# Patient Record
Sex: FEMALE | Race: BLACK OR AFRICAN AMERICAN
[De-identification: names, ages, dates, MRNs, and addresses within clinical notes are randomized per-mention and may not be internally consistent; named-entity substitution may affect disease eponyms.]

---

## 2018-03-21 ENCOUNTER — HOSPITAL ENCOUNTER (OUTPATIENT)
Dept: HOSPITAL 53 - M SDC | Age: 28
Discharge: HOME | End: 2018-03-21
Attending: PODIATRIST
Payer: COMMERCIAL

## 2018-03-21 DIAGNOSIS — Q66.89: Primary | ICD-10-CM

## 2018-03-21 DIAGNOSIS — D35.2: ICD-10-CM

## 2018-03-21 LAB
CONTROL LINE UCG: (no result)
URINE PREG TEST: NEGATIVE

## 2018-03-21 PROCEDURE — 28308 INCISION OF METATARSAL: CPT

## 2018-03-21 RX ADMIN — DEXAMETHASONE SODIUM PHOSPHATE 1 MG: 4 INJECTION, SOLUTION INTRAMUSCULAR; INTRAVENOUS at 08:57

## 2018-03-21 RX ADMIN — BUPIVACAINE HYDROCHLORIDE 1 ML: 5 INJECTION, SOLUTION EPIDURAL; INTRACAUDAL at 07:39

## 2018-03-21 RX ADMIN — DEXAMETHASONE SODIUM PHOSPHATE 1 MG: 4 INJECTION, SOLUTION INTRAMUSCULAR; INTRAVENOUS at 08:26

## 2018-03-21 RX ADMIN — LIDOCAINE HYDROCHLORIDE 1 ML: 10 INJECTION, SOLUTION INFILTRATION; PERINEURAL at 07:39

## 2018-03-21 RX ADMIN — SODIUM CHLORIDE, POTASSIUM CHLORIDE, SODIUM LACTATE AND CALCIUM CHLORIDE 1 MLS/HR: 600; 310; 30; 20 INJECTION, SOLUTION INTRAVENOUS at 07:05

## 2018-08-16 ENCOUNTER — HOSPITAL ENCOUNTER (OUTPATIENT)
Dept: HOSPITAL 53 - M LAB REF | Age: 28
End: 2018-08-16
Attending: PODIATRIST
Payer: COMMERCIAL

## 2018-08-16 DIAGNOSIS — L03.116: Primary | ICD-10-CM

## 2018-08-16 PROCEDURE — 87186 SC STD MICRODIL/AGAR DIL: CPT

## 2018-09-05 ENCOUNTER — HOSPITAL ENCOUNTER (OUTPATIENT)
Dept: HOSPITAL 53 - M SDC | Age: 28
Discharge: HOME | End: 2018-09-05
Attending: PODIATRIST
Payer: COMMERCIAL

## 2018-09-05 DIAGNOSIS — Z79.899: ICD-10-CM

## 2018-09-05 DIAGNOSIS — M21.6X2: ICD-10-CM

## 2018-09-05 DIAGNOSIS — D35.2: ICD-10-CM

## 2018-09-05 DIAGNOSIS — Z47.2: Primary | ICD-10-CM

## 2018-09-05 LAB
CONTROL LINE UCG: (no result)
URINE PREG TEST: NEGATIVE

## 2018-09-05 PROCEDURE — 28308 INCISION OF METATARSAL: CPT

## 2018-09-05 RX ADMIN — LIDOCAINE HYDROCHLORIDE 1 ML: 10 INJECTION, SOLUTION EPIDURAL; INFILTRATION; INTRACAUDAL; PERINEURAL at 09:35

## 2018-09-05 RX ADMIN — HYDROCODONE BITARTRATE AND ACETAMINOPHEN 1 TAB: 5; 325 TABLET ORAL at 11:45

## 2018-09-05 RX ADMIN — DEXAMETHASONE SODIUM PHOSPHATE 1 MG: 4 INJECTION, SOLUTION INTRAMUSCULAR; INTRAVENOUS at 09:09

## 2018-09-05 RX ADMIN — BUPIVACAINE HYDROCHLORIDE 1 ML: 5 INJECTION, SOLUTION EPIDURAL; INTRACAUDAL at 09:35

## 2019-03-13 ENCOUNTER — HOSPITAL ENCOUNTER (EMERGENCY)
Dept: HOSPITAL 53 - M ED | Age: 29
Discharge: HOME | End: 2019-03-13
Payer: COMMERCIAL

## 2019-03-13 VITALS — HEIGHT: 65 IN | WEIGHT: 183.42 LBS | BODY MASS INDEX: 30.56 KG/M2

## 2019-03-13 VITALS — DIASTOLIC BLOOD PRESSURE: 64 MMHG | SYSTOLIC BLOOD PRESSURE: 122 MMHG

## 2019-03-13 DIAGNOSIS — Z3A.00: ICD-10-CM

## 2019-03-13 DIAGNOSIS — O20.0: Primary | ICD-10-CM

## 2019-03-13 LAB
B-HCG SERPL-ACNC: 596 MIU/ML
BASOPHILS # BLD AUTO: 0 10^3/UL (ref 0–0.2)
BASOPHILS NFR BLD AUTO: 0.3 % (ref 0–1)
BUN SERPL-MCNC: 10 MG/DL (ref 7–18)
CALCIUM SERPL-MCNC: 9.5 MG/DL (ref 8.5–10.1)
CHLORIDE SERPL-SCNC: 108 MEQ/L (ref 98–107)
CO2 SERPL-SCNC: 25 MEQ/L (ref 21–32)
CREAT SERPL-MCNC: 0.89 MG/DL (ref 0.55–1.3)
EOSINOPHIL # BLD AUTO: 0 10^3/UL (ref 0–0.5)
EOSINOPHIL NFR BLD AUTO: 0.1 % (ref 0–3)
GFR SERPL CREATININE-BSD FRML MDRD: > 60 ML/MIN/{1.73_M2} (ref 60–?)
GLUCOSE SERPL-MCNC: 101 MG/DL (ref 70–100)
HCT VFR BLD AUTO: 37.5 % (ref 36–47)
HGB BLD-MCNC: 12.5 G/DL (ref 12–15.5)
LYMPHOCYTES # BLD AUTO: 1.3 10^3/UL (ref 1.5–4.5)
LYMPHOCYTES NFR BLD AUTO: 17.8 % (ref 24–44)
MCH RBC QN AUTO: 32.7 PG (ref 27–33)
MCHC RBC AUTO-ENTMCNC: 33.3 G/DL (ref 32–36.5)
MCV RBC AUTO: 98.2 FL (ref 80–96)
MONOCYTES # BLD AUTO: 0.4 10^3/UL (ref 0–0.8)
MONOCYTES NFR BLD AUTO: 5 % (ref 0–5)
NEUTROPHILS # BLD AUTO: 5.7 10^3/UL (ref 1.8–7.7)
NEUTROPHILS NFR BLD AUTO: 76.4 % (ref 36–66)
PLATELET # BLD AUTO: 250 10^3/UL (ref 150–450)
POTASSIUM SERPL-SCNC: 4.1 MEQ/L (ref 3.5–5.1)
RBC # BLD AUTO: 3.82 10^6/UL (ref 4–5.4)
SODIUM SERPL-SCNC: 140 MEQ/L (ref 136–145)
WBC # BLD AUTO: 7.4 10^3/UL (ref 4–10)

## 2019-03-14 NOTE — HPE
DATE OF ADMISSION:  2019

 

This lady is a 28-year-old  2, para 1 who was seen through the emergency

department who came in with excessive vaginal bleeding and some lower abdominal

pain.  She did a urine pregnancy test at the end of January, did one positive

urine pregnancy test in February, and then came into emergency with vaginal

bleeding and lower pelvic pain.  Her last menstrual period (LMP) was 2019.

 

Her past history is that she has had a previous  section for a live birth

female infant because of nonreassuring fetal heart tones, and she has presently

been using nothing for birth control attempting to get pregnant.  She is a

prolactinoma and she is on cabergoline 0.5 mg p.o. twice a week to reduce her

prolactin level so that she get pregnant.  Besides her  section, she has

had surgery on her left ankle and she has had a wisdom teeth removed and she has

regular evaluations of her pituitary gland.  She has been on her medication since

, which has been able to reduce her prolactin level.  She denies any

headache, visual disturbances, pain or pressure in her head.  No evidence of

migraines.

 

On examination, she does not appear to be in any acute distress.  Her temperature

is 98.5, pulse was 88, blood pressure was 142/84 on admission, respirations were

20 with 98% of oxygen saturation.

 

CBC shows that she has a low hemoglobin of 12.5, hematocrit 37.5 and platelets

were 250.  Her urine shows a 1.009, pH of 7, negative protein, negative glucose,

negative bilirubin, negative nitrates.  She has a trace of leukocyte esterase, 3+

blood, 1+ bacteria.  Her anion gap is 7 and her rest of her chemistry is within

normal limits.  GFR is greater than 60.  Her quantitative beta hCG is 596.

 

She had an initial ultrasound which showed a empty uterus, appeared to have a

normal endometrial thickness, a right adnexal area which was normal.  Could not

demonstrate the left adnexal area, but thought maybe she had a vascular lesion in

the adnexal area but not distinguishable from the ovary.  There was no free fluid

in the cul-de-sac.

 

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

 

The rest of her examination is unremarkable.  She is normocephalic, atraumatic.

Neck full range of motion.  Pupils equal and reactive to light.  Distal pulses

are symmetric.  No evidence of deep vein thrombosis (DVT), pulmonary embolism

(PE), or superficial phlebitis.  Lungs are clear bilaterally to bases.  No

wheezes or rhonchi.  Abdomen is soft, four quadrant bowel sounds are noted.  She

has no rebound or guarding.  She has no rashes, lesions or pruritus.  No

arthralgia or myalgia.  No complaints of cough, wheezes, shortness of breath or

dyspnea on exertion.  She has no nausea, vomiting or diarrhea.  The only

endocrine issue she has is her prolactinoma under control with medication.  She

has no GYN issues.

 

Past medical and surgical history, as mentioned before.

 

Family history is noncontributory.

 

She does not smoke, drink or abuse drugs.  No domestic violence.   to a

soldier with good support systems.

 

On examination speculum, the cervix is closed.  There is some fresh blood, but

not profuse amount.  The uterus is tender, midline and slightly enlarged.  The

left adnexal area is tender.  The right adnexa area is nontender.

 

We discussed with the patient plan of management in the fact that she does not

have an ectopic pregnancy, does have not dermoid tumor.  Explained to her what a

dermoid tumor was.  Explained the fact that she may be in the throws of

spontaneous miscarriage or complete miscarriage; however, this has to be dealt

with as a separate issue to her dermoid.  In fact, we will repeat her

quantitative beta hCG at [facility] in 48 hours time and she is to come and get a

letter for quarters and she has to make an appointment with Dr. Gould on Tuesday,

she has a repeat of her quant and possibility at that time in evaluating if it is

a complete  or if it is a missed , to have a combination of a

dilatation and curettage with a laparoscopic excision of her left dermoid tumor.

 

 

The patient was encouraged to come back to the hospital if she has an acute

episode of left lower quadrant pain, which may indicate torsion, or if she has

profuse bleeding which may indicate uncontrolled missed .  The patient

and the  expressed understanding.  The patient left with instructions and

a preliminary letter of quarters, but will  the official document tomorrow

morning and make her appointments.

## 2019-07-14 ENCOUNTER — HOSPITAL ENCOUNTER (EMERGENCY)
Dept: HOSPITAL 53 - M ED | Age: 29
Discharge: HOME | End: 2019-07-14
Payer: COMMERCIAL

## 2019-07-14 VITALS — HEIGHT: 65 IN | WEIGHT: 181.66 LBS | BODY MASS INDEX: 30.27 KG/M2

## 2019-07-14 VITALS — SYSTOLIC BLOOD PRESSURE: 151 MMHG | DIASTOLIC BLOOD PRESSURE: 95 MMHG

## 2019-07-14 DIAGNOSIS — O20.8: Primary | ICD-10-CM

## 2019-07-14 DIAGNOSIS — Z3A.10: ICD-10-CM

## 2019-07-14 LAB
BASOPHILS # BLD AUTO: 0 10^3/UL (ref 0–0.2)
BASOPHILS NFR BLD AUTO: 0.1 % (ref 0–1)
EOSINOPHIL # BLD AUTO: 0 10^3/UL (ref 0–0.5)
EOSINOPHIL NFR BLD AUTO: 0.5 % (ref 0–3)
HCT VFR BLD AUTO: 35.8 % (ref 36–47)
HGB BLD-MCNC: 12.4 G/DL (ref 12–15.5)
LYMPHOCYTES # BLD AUTO: 2.4 10^3/UL (ref 1.5–4.5)
LYMPHOCYTES NFR BLD AUTO: 29.1 % (ref 24–44)
MCH RBC QN AUTO: 33.7 PG (ref 27–33)
MCHC RBC AUTO-ENTMCNC: 34.6 G/DL (ref 32–36.5)
MCV RBC AUTO: 97.3 FL (ref 80–96)
MONOCYTES # BLD AUTO: 0.6 10^3/UL (ref 0–0.8)
MONOCYTES NFR BLD AUTO: 7 % (ref 0–5)
NEUTROPHILS # BLD AUTO: 5.2 10^3/UL (ref 1.8–7.7)
NEUTROPHILS NFR BLD AUTO: 62.9 % (ref 36–66)
PLATELET # BLD AUTO: 247 10^3/UL (ref 150–450)
RBC # BLD AUTO: 3.68 10^6/UL (ref 4–5.4)
WBC # BLD AUTO: 8.3 10^3/UL (ref 4–10)

## 2019-07-14 NOTE — REPVR
EXAM: 

 US Pregnancy First Trimester, Transabdominal 



EXAM DATE/TIME: 

 7/14/2019 8:16 PM 



CLINICAL HISTORY: 

 29 years old, female; Lmp or gestational age (in weeks): 9w4d; Antepartum 

complications; Bleeding; Pregnant; Additional info: Vaginal bleeding 



TECHNIQUE: 

 Imaging protocol: Real-time transabdominal obstetrical ultrasound of the 

maternal pelvis and a first trimester pregnancy, less than 14 weeks 0 days, 

with image documentation. 



COMPARISON: 

 No relevant prior studies available. 



FINDINGS: 



GESTATION: 

 Gestation: Gestational sac present. Fetal pole present. 

 Heart rate: The fetal heart rate is 160 beats per minute. 

 Placenta: A small subchorionic hemorrhage measuring 1.3 cm x 1.0 cm x 1.0 cm 

is present. 

 Amniotic fluid: Normal for gestational age. 



BIOMETRY: 

 Estimated gestational age: Estimated gestational age by crown-rump length of 

3.3 cm is 10 weeks, 1 day. 

 Estimated due date: The estimated date of delivery by ultrasound is 

02/08/2020. 



MATERNAL: 

 Uterus: Unremarkable. 

 Cervix: Unremarkable. 

 Right adnexa: There are 2 cysts within the right ovary. These measure 4.2 cm x 

2.7 cm x 3.0 cm and 4.3 cm x 2.9 cm x 3.1 cm. 

 Left adnexa: A known left ovarian dermoid is again identified. It measures 4.2 

cm x 3.5 cm x 3.7 cm. 

 Intraperitoneal: No free intraperitoneal fluid is identified. 



IMPRESSION: 

1. Single, live intrauterine pregnancy with an estimated gestational age of 10 

weeks, 1 day and a heart rate of 160 beats per minute. 

2. Small subchorionic hemorrhage. 



Electronically signed by: Griffin Casas On 07/14/2019  21:37:50 PM

## 2020-02-01 ENCOUNTER — HOSPITAL ENCOUNTER (INPATIENT)
Dept: HOSPITAL 53 - M LDO | Age: 30
LOS: 2 days | Discharge: HOME | DRG: 773 | End: 2020-02-03
Attending: OBSTETRICS & GYNECOLOGY | Admitting: OBSTETRICS & GYNECOLOGY
Payer: COMMERCIAL

## 2020-02-01 ENCOUNTER — HOSPITAL ENCOUNTER (OUTPATIENT)
Dept: HOSPITAL 53 - M LDO | Age: 30
Discharge: HOME | End: 2020-02-01
Attending: OBSTETRICS & GYNECOLOGY
Payer: COMMERCIAL

## 2020-02-01 VITALS — WEIGHT: 205.25 LBS | BODY MASS INDEX: 34.2 KG/M2 | HEIGHT: 65 IN

## 2020-02-01 VITALS — BODY MASS INDEX: 34.6 KG/M2 | HEIGHT: 65 IN | WEIGHT: 207.68 LBS

## 2020-02-01 VITALS — DIASTOLIC BLOOD PRESSURE: 75 MMHG | SYSTOLIC BLOOD PRESSURE: 135 MMHG

## 2020-02-01 VITALS — DIASTOLIC BLOOD PRESSURE: 66 MMHG | SYSTOLIC BLOOD PRESSURE: 123 MMHG

## 2020-02-01 VITALS — DIASTOLIC BLOOD PRESSURE: 71 MMHG | SYSTOLIC BLOOD PRESSURE: 120 MMHG

## 2020-02-01 VITALS — DIASTOLIC BLOOD PRESSURE: 64 MMHG | SYSTOLIC BLOOD PRESSURE: 123 MMHG

## 2020-02-01 VITALS — DIASTOLIC BLOOD PRESSURE: 73 MMHG | SYSTOLIC BLOOD PRESSURE: 127 MMHG

## 2020-02-01 VITALS — DIASTOLIC BLOOD PRESSURE: 71 MMHG | SYSTOLIC BLOOD PRESSURE: 124 MMHG

## 2020-02-01 VITALS — SYSTOLIC BLOOD PRESSURE: 119 MMHG | DIASTOLIC BLOOD PRESSURE: 71 MMHG

## 2020-02-01 DIAGNOSIS — Z3A.39: ICD-10-CM

## 2020-02-01 DIAGNOSIS — O32.6XX0: ICD-10-CM

## 2020-02-01 DIAGNOSIS — O34.211: Primary | ICD-10-CM

## 2020-02-01 DIAGNOSIS — Z3A.38: ICD-10-CM

## 2020-02-01 DIAGNOSIS — O26.893: Primary | ICD-10-CM

## 2020-02-01 DIAGNOSIS — L50.9: ICD-10-CM

## 2020-02-01 DIAGNOSIS — E86.0: ICD-10-CM

## 2020-02-01 LAB
BASE EXCESS BLDCOA CALC-SCNC: -1.8 MMOL/L
BASE EXCESS BLDCOV CALC-SCNC: -2.8 MMOL/L
CO2 BLDCOA CALC-SCNC: 28.5 MEQ/L
CO2 BLDCOV CALC-SCNC: 24.5 MEQ/L
EST. AVERAGE GLUCOSE BLD GHB EST-MCNC: 117 MG/DL (ref 60–110)
HCO3 STD BLDCOA-SCNC: 21.1 MEQ/L
HCO3 STD BLDCOA-SCNC: 26.7 MEQ/L
HCO3 STD BLDCOV-SCNC: 21.4 MEQ/L
HCO3 STD BLDCOV-SCNC: 23.2 MEQ/L
HCT VFR BLD AUTO: 40.6 % (ref 36–47)
HGB BLD-MCNC: 13.3 G/DL (ref 12–15.5)
MCH RBC QN AUTO: 33.5 PG (ref 27–33)
MCHC RBC AUTO-ENTMCNC: 32.8 G/DL (ref 32–36.5)
MCV RBC AUTO: 102.3 FL (ref 80–96)
PCO2 BLDCOA: 61.2 MMHG
PCO2 BLDCOV: 44.2 MMHG
PH BLDCOA: 7.26 UNITS
PH BLDCOV: 7.34 UNITS
PLATELET # BLD AUTO: 201 10^3/UL (ref 150–450)
PO2 BLDCOA: 12.9 MMHG
PO2 BLDCOV: 27 MMHG
RBC # BLD AUTO: 3.97 10^6/UL (ref 4–5.4)
SAO2 % BLDCOA: 21.2 %
SAO2 % BLDCOV: 65.6 %
WBC # BLD AUTO: 7 10^3/UL (ref 4–10)

## 2020-02-01 NOTE — IPNPDOC
Text Note


Date of Service


The patient was seen on 20.





NOTE


Triage Note





Sahhla is a  with SIUP at 38w3d presenting for full body itching and hives

that started late last night. She called me and I recommended trying oral 

benadryl which she did, but had no relief, so presented to L&D around 0500. I 

ordered IVF and IV benadryl 25mg x1, and patient still had no appreciable change

in her symptoms. She has felt occasional/irregular ctx, no loss of fluid, no 

vaginal bleeding. Good fetal movement. Denies any changes to 

soaps/detergents/diet. Had mac&cheese last night, nothing new or strange. No SOB

or any issues breathing/wheezing. She does not normally have allergies of any 

kind and she has never had this particular issue before.





History is significant for prior  section, counseled previously for 

TOLAC and still desires TOLAC.





Vitals wnl (pulse initially high but settled down), afebrile


General: WDWN, resting in bed itching occasionally, conversant


Abdomen: soft, gravid, NTTP





Full skin eval of legs/abdomen/arms/back reveals true hives over the back of her

neck but no other obvious areas





SCE 3/50/-2, first check was by RN and 2nd by me, unchanged over 2 hours





Cat I FHRT with bl 130, +accels, -decels, mod alexus


[place]: ctx irregular





Assessment: 


Shahla is a  with SIUP at 38w3d presenting for full body itching and hives

(over back of neck) that started suddenly late last night recalcitrant to 

benadryl. While I am not certain of etiology, it may well be an atypical 

reaction to very early latent labor given patient's SCE 3/50/-2 (unchanged over 

2hr) with irregular ctx. Reassuring fetal assessment. Vitals wnl. Patient still 

desires TOLAC. 





Plan:


-Safe for discharge 


-Continue benadryl 50mg PO q6hr prn 


-If still having this itching and discomfort on Monday at her scheduled appt, we

can try a course of steroids and/or H2 anti-histamine


-Continue good hydration 


-Discussed labor precautions with patient and when to return





Dr. Yaneli Jhaveri MD





VS,Tia, I+O


VS, Fishbone, I+O





Vital Signs








  Date Time  Temp Pulse Resp B/P (MAP) Pulse Ox O2 Delivery O2 Flow Rate FiO2


 


20 07:06 98.3 106 16 120/71 (87) 98 Room Air  





 98.3       

















Yaneli Jhaveri MD            2020 08:03

## 2020-02-01 NOTE — HPE
DATE OF ADMISSION:  2020

 

 

29-year-old  3, para 1,  1, last menstrual period (LMP)

2019, estimated date of confinement (EDC) 2020 having contractions

times 24 hours, was seen in [facility] yesterday.  Cervix was 3 cm high balotable,

presenting part.  No vaginal loss or bleeding.  Risk factors is she had a

previous  section for failure to descend, failure to dilate.  She has a

pituitary tumor which is stable.  She had an early 1-hour GTT was elevated, a

3-hour GTT was normal and she has an abnormal Pap smear showing LGSIL.

 

PAST HISTORY:  2015  section at 42 weeks, female, 7 pounds 4 ounces.

2019, 5 weeks spontaneous .

 

LABORATORY:  B+, human immunodeficiency virus (HIV) negative, hepatitis negative,

RPR negative, rubella immune, Varicella immune.  Pap was LGSIL.  Urine was

negative.  Gonorrhea and chlamydia negative. 1-hour glucose 184, 3-hour GTT

fasting 89, 1-hour 164, 2-hour 124, 3 [id number] and she is GBS negative.  Blood

pressure 123/64, respirations 18, pulse 108, temperature is 98.7.  Urine is

1.025, 2+ ketones and 5 pH.  She is presently dehydrated and the cervix is

unchanged.  Symphysis fundus height is 40, vertex presenting.  No scar pain.

Category one strip. The presenting part is not engaged and balotable.

 

Plan is to hydrate the patient and do a repeat  section after 6 hours as

the patient had recently something to eat.  We discussed the case with

anesthesia, who recommend 6 hours.  We discussed the risks and benefits of repeat

 section including hemorrhage, infection, perforation, death,

reoperation, remote possibility of blood transfusion, remote possibility of

hysterectomy, life-threatening bleeding situations, remote possibility fetal

laceration and/or admission to the NICU.  The patient expressed understanding of

the above, signed the consent form.  All questions were answered.  40-minute

discussion.

## 2020-02-02 VITALS — SYSTOLIC BLOOD PRESSURE: 105 MMHG | DIASTOLIC BLOOD PRESSURE: 63 MMHG

## 2020-02-02 VITALS — SYSTOLIC BLOOD PRESSURE: 131 MMHG | DIASTOLIC BLOOD PRESSURE: 76 MMHG

## 2020-02-02 VITALS — SYSTOLIC BLOOD PRESSURE: 120 MMHG | DIASTOLIC BLOOD PRESSURE: 58 MMHG

## 2020-02-02 VITALS — DIASTOLIC BLOOD PRESSURE: 55 MMHG | SYSTOLIC BLOOD PRESSURE: 109 MMHG

## 2020-02-02 VITALS — DIASTOLIC BLOOD PRESSURE: 68 MMHG | SYSTOLIC BLOOD PRESSURE: 130 MMHG

## 2020-02-02 VITALS — SYSTOLIC BLOOD PRESSURE: 112 MMHG | DIASTOLIC BLOOD PRESSURE: 62 MMHG

## 2020-02-02 VITALS — SYSTOLIC BLOOD PRESSURE: 114 MMHG | DIASTOLIC BLOOD PRESSURE: 58 MMHG

## 2020-02-02 VITALS — SYSTOLIC BLOOD PRESSURE: 122 MMHG | DIASTOLIC BLOOD PRESSURE: 58 MMHG

## 2020-02-02 VITALS — DIASTOLIC BLOOD PRESSURE: 59 MMHG | SYSTOLIC BLOOD PRESSURE: 119 MMHG

## 2020-02-02 VITALS — DIASTOLIC BLOOD PRESSURE: 58 MMHG | SYSTOLIC BLOOD PRESSURE: 125 MMHG

## 2020-02-02 LAB
HCT VFR BLD AUTO: 36.3 % (ref 36–47)
HGB BLD-MCNC: 11.9 G/DL (ref 12–15.5)
MCH RBC QN AUTO: 34.3 PG (ref 27–33)
MCHC RBC AUTO-ENTMCNC: 32.8 G/DL (ref 32–36.5)
MCV RBC AUTO: 104.6 FL (ref 80–96)
PLATELET # BLD AUTO: 164 10^3/UL (ref 150–450)
RBC # BLD AUTO: 3.47 10^6/UL (ref 4–5.4)
WBC # BLD AUTO: 8.8 10^3/UL (ref 4–10)

## 2020-02-02 RX ADMIN — KETOROLAC TROMETHAMINE SCH MG: 30 INJECTION, SOLUTION INTRAMUSCULAR at 04:42

## 2020-02-02 RX ADMIN — KETOROLAC TROMETHAMINE SCH MG: 30 INJECTION, SOLUTION INTRAMUSCULAR at 09:07

## 2020-02-02 RX ADMIN — KETOROLAC TROMETHAMINE SCH MG: 30 INJECTION, SOLUTION INTRAMUSCULAR at 16:00

## 2020-02-02 NOTE — IPN
DATE:  2020

 

This lady is a 29-year-old,  3, now para 2 who had a repeat 

section for transverse lie, failure to dilate, failure to descend, of a female

infant, 7 pounds 6 ounces, 3340 grams.  Overnight, she had some significant

nausea and vomiting secondary to analgesics.  She was given Zofran.  However, she

has now resolved that issue, but she is behind in her fluids and her last urinary

output was 20 mL/hour.  Therefore, she was given a bolus of IV of 800 mL, to

follow with 125 mL per hour, and monitor output in order to see there is an

increase.

 

Her blood pressure this morning is 119/59, respirations 16, pulse 73 and

temperature 97.4.  The rest of the examination is unremarkable.  Normocephalic,

atraumatic.  Neck full range of motion.  Pupils equal and reactive to light.

Distal pulses symmetric.  No evidence of deep vein thrombosis (DVT) pulmonary

embolus  (PE) or superficial phlebitis.  Chest is clear bilaterally at bases.  No

wheezes or rhonchi.  No costovertebral angle (CVA) tenderness.  Abdomen soft.

Four quadrant bowel sounds are noted.  Incision is clean and dry.  Lochia is

moderate.

 

In summary, we have a postpartum day #1 and postoperative day #1 patient with

slightly decreased urinary output, a little bit fluid behind, which we will

offset by an IV bolus.

## 2020-02-03 VITALS — SYSTOLIC BLOOD PRESSURE: 115 MMHG | DIASTOLIC BLOOD PRESSURE: 59 MMHG

## 2020-02-03 NOTE — RO
DATE OF PROCEDURE:  2020

 

This is a 29-year-old  3, para 1 admitted in labor, prodromal over the

last 13 hours.  She was previously checked, found to be 1 cm, not engaged in the

pelvis.  Cervix is thick and closed and high.  When she came in it was similar

and she had had a previous  section for similar episodic motion.  She 
had

eaten and therefore we had to wait 6 hours and with irregular type contractions

she never progressed beyond what she did at her admission examination.

Therefore, a repeat  section was entertained.

 

PREOPERATIVE DIAGNOSIS: previous cs failure to descend

 

POSTOPERATIVE DIAGNOSIS: previous cs failure to descend transverse lie head 
tomaternal right

 

PROCEDURE: repeat cs

 

SURGEON:  Alcon Gould MD

 

ASSISTANT:  Dr. Turk for extraction, retraction and visualization.

 

ESTIMATED BLOOD LOSS:  500 mL.

 

ANESTHESIA:  She had a spinal plus local anesthetic for intraperitoneal

procedures.

 

After adequate time out, prepped and draped in the supine position, Mclean

catheter in the bladder draining clear urine, acetaminophen suppository 1300 mg

per rectum, appropriate antibiotics in place and sequentials in place, a

Pfannenstiel incision was made through the previous one, passing through

abdominal layers securing hemostasis.  Opening the peritoneal cavity we put the

Mobius in and the bladder was reflected well down anteriorly.  A low transverse

incision was made into the uterus.  We noticed that this patient had the head of

the baby abutted against the maternal right and we had to reestablish vertex and

towards the pelvis and an AROM draining clear liqua, delivered a live birth

female infant 3340 grams, 7 pounds 6 ounces, Apgar scores of eight and nine at 1

and 5 minutes respectfully.  Arterial pH 7.21, base excess -1.8, venous pH was

7.33, base excess -2.8.  The placenta spontaneously delivered.  Three-vessels in

the cord.  Membranes and tissues intact.  We swept out the uterus and then over

sewed the lower segment in the usual fashion in two layers and

reperitonealization was performed.  With instrument and pad count correct, the

Mobius was removed, peritoneum was closed with a running stitch of #2-0, fascia

was closed in the appropriate fashion, subcutaneous suture  and Dexon to the 
skin, Marcaine

0.25% 10 mL to the incisional site, spray and Telfa.  The patient was taken back

to recovery in good condition.

[facility] HospitalBRITTANY

## 2020-02-03 NOTE — DS.PDOC
Discharge Summary


General


Date of Admission


2020 at 17:06


Date of Discharge





Feb 3, 2020





Discharge Summary








HOSPITAL COURSE: Ms. Boland is a 30 yo G3 now P2 who was admitted to [facility] on 

2020 for painful contractions in the setting of a prior  section.  

She underwent an uncomplicated RLTCS on that same day with Dr. Gould.  Her 

postpartum course was unremarkable.  On her day of discharge she met all 

appropriate discharge criteria.  She was ambulating, voiding on her own, 

tolerating a regular diet, had minimal lochia, and her pain was well controlled 

with PO pain medications. 





DISCHARGE MEDICATIONS: Please see below.


 


ALLERGIES: Please see below.





PHYSICAL EXAMINATION ON DISCHARGE:


VITAL SIGNS: Please see below.


GENERAL: Patient AAOX3, sitting up in bed, pleasant and conversant


ABDOMINAL EXAMINATION: Soft, non distended.  Fundus firm at U-2.  Incision well 

appearing and  clean/dry/intact.  No tenderness to palpation.


EXTREMITIES: No edema 


PSYCHIATRIC EXAMINATION: Affect appropriate





LABORATORY DATA: Please see below.





ACTIVITY: Pelvic rest for 6 weeks.  No heavy lifting for 6 weeks.





DIET: Regular





DISCHARGE PLAN: Discharge home





DISPOSITION: Discharge home on 2020





DISCHARGE INSTRUCTIONS:


1. pelvic rest for 6 weeks.


2. No heavy lifting for 6 weeks





ITEMS TO FOLLOWUP ON ON OUTPATIENT:


1. Incision check in 2 weeks in the [place] [facility]





DISCHARGE CONDITION: Stable.





TIME SPENT ON DISCHARGE: Greater than 20 minutes.





Sneha Luna DO





Vital Signs/I&Os





Vital Signs








  Date Time  Temp Pulse Resp B/P (MAP) Pulse Ox O2 Delivery O2 Flow Rate FiO2


 


2/3/20 05:51 98.3 66 17 115/59 (77) 97 Room Air  





 98.3       














I&O- Last 24 Hours up to 6 AM 


 


 2/3/20





 06:00


 


Intake Total 633 ml


 


Output Total 695 ml


 


Balance -62 ml











Discharge Medications


Scheduled


Diphenhydramine HCl (Benadryl) 25 Mg Capsule, 25 MG PO QPM for Itching, 

(Reported)


Pnv,Calcium 72/Iron/Folic Acid (Prenatal Plus Tablet) 1 Each Tablet, 1 TAB PO 

DAILY, (Reported)





Scheduled PRN


Ibuprofen (Ibuprofen) 800 Mg Tablet, 800 MG PO Q8HP PRN for PAIN LEVEL 6-10


Oxycodone/Acetaminophen (Oxycodone-Acetaminophen 5-325) 1 Each Tablet, 2 TAB PO 

Q6H PRN for SEVERE PAIN (PS 8-10)





Allergies


Coded Allergies:  


     No Known Allergies (Unverified , 19)











SNEHA LUNA DO          Feb 3, 2020 07:34